# Patient Record
Sex: MALE | Race: WHITE | Employment: STUDENT | URBAN - METROPOLITAN AREA
[De-identification: names, ages, dates, MRNs, and addresses within clinical notes are randomized per-mention and may not be internally consistent; named-entity substitution may affect disease eponyms.]

---

## 2017-03-24 ENCOUNTER — APPOINTMENT (OUTPATIENT)
Dept: GENERAL RADIOLOGY | Age: 22
End: 2017-03-24
Attending: NURSE PRACTITIONER
Payer: COMMERCIAL

## 2017-03-24 ENCOUNTER — HOSPITAL ENCOUNTER (OUTPATIENT)
Age: 22
Discharge: HOME | End: 2017-03-24
Payer: COMMERCIAL

## 2017-03-24 VITALS
TEMPERATURE: 102 F | OXYGEN SATURATION: 98 % | BODY MASS INDEX: 23 KG/M2 | HEART RATE: 105 BPM | SYSTOLIC BLOOD PRESSURE: 141 MMHG | DIASTOLIC BLOOD PRESSURE: 83 MMHG | RESPIRATION RATE: 16 BRPM | WEIGHT: 136 LBS

## 2017-03-24 DIAGNOSIS — J11.1 INFLUENZA: Primary | ICD-10-CM

## 2017-03-24 LAB — POCT RAPID STREP: NEGATIVE

## 2017-03-24 PROCEDURE — 87081 CULTURE SCREEN ONLY: CPT | Performed by: NURSE PRACTITIONER

## 2017-03-24 PROCEDURE — 71020 XR CHEST PA + LAT CHEST (CPT=71020): CPT

## 2017-03-24 PROCEDURE — 99213 OFFICE O/P EST LOW 20 MIN: CPT

## 2017-03-24 PROCEDURE — 87147 CULTURE TYPE IMMUNOLOGIC: CPT | Performed by: NURSE PRACTITIONER

## 2017-03-24 PROCEDURE — 87430 STREP A AG IA: CPT | Performed by: NURSE PRACTITIONER

## 2017-03-24 PROCEDURE — 99214 OFFICE O/P EST MOD 30 MIN: CPT

## 2017-03-24 RX ORDER — ACETAMINOPHEN 500 MG
1000 TABLET ORAL ONCE
Status: COMPLETED | OUTPATIENT
Start: 2017-03-24 | End: 2017-03-24

## 2017-03-25 NOTE — ED PROVIDER NOTES
Patient Seen in: [address] [facility] [place]    History   Patient presents with:  Nasal Congestion  Body ache and/or chills  Sore Throat    Stated Complaint: cough/congestion    The history is provided by the patient.        22-year-old male who prese HPI.    Physical Exam       ED Triage Vitals   BP 03/24/17 1618 141/83 mmHg   Pulse 03/24/17 1618 105   Resp 03/24/17 1618 16   Temp 03/24/17 1618 102.3 °F (39.1 °C)   Temp src 03/24/17 1618 Oral   SpO2 03/24/17 1618 98 %   O2 Device 03/24/17 1618 None (Ro 3/24/2017 at 16:37     Approved by: Angelina Castro MD            MDM    I discussed the diagnosis and need for followup with their primary care physician for further evaluation and care.  Patient states they understand diagnosis, followup plan and agree wi

## 2017-09-17 ENCOUNTER — APPOINTMENT (OUTPATIENT)
Dept: GENERAL RADIOLOGY | Age: 22
End: 2017-09-17
Attending: FAMILY MEDICINE
Payer: COMMERCIAL

## 2017-09-17 ENCOUNTER — HOSPITAL ENCOUNTER (OUTPATIENT)
Age: 22
Discharge: ANOTHER HOSPITAL | End: 2017-09-17
Attending: FAMILY MEDICINE
Payer: COMMERCIAL

## 2017-09-17 ENCOUNTER — APPOINTMENT (OUTPATIENT)
Dept: CT IMAGING | Age: 22
End: 2017-09-17
Attending: FAMILY MEDICINE
Payer: COMMERCIAL

## 2017-09-17 VITALS
TEMPERATURE: 98 F | HEIGHT: 64 IN | SYSTOLIC BLOOD PRESSURE: 127 MMHG | WEIGHT: 135 LBS | DIASTOLIC BLOOD PRESSURE: 81 MMHG | RESPIRATION RATE: 20 BRPM | HEART RATE: 68 BPM | OXYGEN SATURATION: 99 % | BODY MASS INDEX: 23.05 KG/M2

## 2017-09-17 DIAGNOSIS — K35.80 ACUTE APPENDICITIS, UNSPECIFIED ACUTE APPENDICITIS TYPE: Primary | ICD-10-CM

## 2017-09-17 DIAGNOSIS — R10.9 ABDOMINAL PAIN, ACUTE: ICD-10-CM

## 2017-09-17 LAB
#LYMPHOCYTE IC: 1.6 X10ˆ3/UL (ref 0.9–3.2)
#MXD IC: 1.4 X10ˆ3/UL (ref 0.1–1)
#NEUTROPHIL IC: 14.6 X10ˆ3/UL (ref 1.3–6.7)
CREAT SERPL-MCNC: 1 MG/DL (ref 0.7–1.2)
GLUCOSE BLD-MCNC: 97 MG/DL (ref 65–99)
HCT IC: 41.6 % (ref 37–54)
HGB IC: 14.6 G/DL (ref 13–17)
ISTAT BLOOD GAS TCO2: 25 MMOL/L (ref 22–32)
ISTAT BUN: 12 MG/DL (ref 8–20)
ISTAT CHLORIDE: 101 MMOL/L (ref 101–111)
ISTAT HEMATOCRIT: 44 % (ref 37–54)
ISTAT IONIZED CALCIUM: 1.21 MMOL/L (ref 1.12–1.32)
ISTAT POTASSIUM: 3.8 MMOL/L (ref 3.6–5.1)
ISTAT SODIUM: 139 MMOL/L (ref 136–144)
LYMPHOCYTES NFR BLD AUTO: 9.2 %
MCH IC: 31.6 PG (ref 27–33.2)
MCHC IC: 35.1 G/DL (ref 31–37)
MCV IC: 90 FL (ref 80–99)
MIXED CELL %: 8.1 %
NEUTROPHILS NFR BLD AUTO: 82.7 %
PLT IC: 278 X10ˆ3/UL (ref 150–450)
POCT BILIRUBIN URINE: NEGATIVE
POCT BLOOD URINE: NEGATIVE
POCT GLUCOSE URINE: NEGATIVE MG/DL
POCT KETONE URINE: 80 MG/DL
POCT LEUKOCYTE ESTERASE URINE: NEGATIVE
POCT NITRITE URINE: NEGATIVE
POCT PH URINE: 7 (ref 5–8)
POCT SPECIFIC GRAVITY URINE: 1.02
POCT URINE CLARITY: CLEAR
POCT URINE COLOR: YELLOW
POCT UROBILINOGEN URINE: 1 MG/DL
RBC IC: 4.62 X10ˆ6/UL (ref 4.3–5.7)
WBC IC: 17.6 X10ˆ3/UL (ref 4–13)

## 2017-09-17 PROCEDURE — 74000 XR ABDOMEN (KUB) (1 AP VIEW)  (CPT=74000): CPT | Performed by: FAMILY MEDICINE

## 2017-09-17 PROCEDURE — 99214 OFFICE O/P EST MOD 30 MIN: CPT

## 2017-09-17 PROCEDURE — 80047 BASIC METABLC PNL IONIZED CA: CPT

## 2017-09-17 PROCEDURE — 85025 COMPLETE CBC W/AUTO DIFF WBC: CPT | Performed by: FAMILY MEDICINE

## 2017-09-17 PROCEDURE — 74176 CT ABD & PELVIS W/O CONTRAST: CPT | Performed by: FAMILY MEDICINE

## 2017-09-17 PROCEDURE — 81002 URINALYSIS NONAUTO W/O SCOPE: CPT | Performed by: FAMILY MEDICINE

## 2017-09-17 PROCEDURE — 99215 OFFICE O/P EST HI 40 MIN: CPT

## 2017-09-17 RX ORDER — ONDANSETRON 4 MG/1
4 TABLET, ORALLY DISINTEGRATING ORAL ONCE
Status: COMPLETED | OUTPATIENT
Start: 2017-09-17 | End: 2017-09-17

## 2017-09-17 NOTE — ED PROVIDER NOTES
Patient Seen in: [address] [facility] [place]    History   Patient presents with:  Abdominal Pain  Nausea/vomiting    Stated Complaint: ABD PAIN-NAUSEA- MAYBE SOMETHING HE ATE YESTERDAY    HPI  53-year-old male coming in with complaints of abdominal p SKIN: No pallor, no erythema, no cyanosis, warm and dry  Eyes: wnl, normal conjunctiva   HEAD: Normocephalic, atraumatic  EENT: OP - wnl, moist, Nares normal  NECK: FROM, supple  LUNGS: CTAB, no RRW  CV: RRR  ABD: Normoactive bowel sounds, tenderness not Specific Question: What is the Relevant Clinical Indication / Reason for Exam?          Answer: ABD PAIN-NAUSEA- MAYBE SOMETHING HE ATE YESTERDAY      POCT urinalysis dipstick Once      POCT CBC Once      POCT ISTAT chem8 cartridge Once      iStat (Chem 8) thickened measuring 9 mm. No inflammatory changes are noted. Acute appendicitis is considered unlikely, however not entirely excluded. 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     Dictated by: Jairo Plaza MD on 9/17/2017 at 12:32     Vladimir Savage patient.

## 2017-09-17 NOTE — ED INITIAL ASSESSMENT (HPI)
Patient states he ate a spicy chicken sandwich from [email or web address] yesterday at 5691. Approximately 2 hours after eating it he began having chills, nausea and cramping abd pain over entire abd. Emesis x 3. Denies diarrhea. Last BM was today.

## 2017-09-17 NOTE — ED NOTES
Patient is sitting on exam table rocking back and forth. States pain is now a 10/10 on pain scale. Nausea is resolving. Discussed with Dr Dashawn Bentley.

## 2018-04-17 ENCOUNTER — HOSPITAL ENCOUNTER (OUTPATIENT)
Age: 23
Discharge: HOME | End: 2018-04-17
Attending: FAMILY MEDICINE
Payer: COMMERCIAL

## 2018-04-17 VITALS
SYSTOLIC BLOOD PRESSURE: 102 MMHG | OXYGEN SATURATION: 100 % | HEART RATE: 50 BPM | TEMPERATURE: 98 F | DIASTOLIC BLOOD PRESSURE: 60 MMHG | RESPIRATION RATE: 16 BRPM

## 2018-04-17 DIAGNOSIS — K52.9 GASTROENTERITIS: Primary | ICD-10-CM

## 2018-04-17 PROCEDURE — 99214 OFFICE O/P EST MOD 30 MIN: CPT

## 2018-04-17 PROCEDURE — 80047 BASIC METABLC PNL IONIZED CA: CPT

## 2018-04-17 PROCEDURE — 85025 COMPLETE CBC W/AUTO DIFF WBC: CPT | Performed by: FAMILY MEDICINE

## 2018-04-17 PROCEDURE — 96361 HYDRATE IV INFUSION ADD-ON: CPT

## 2018-04-17 PROCEDURE — 96374 THER/PROPH/DIAG INJ IV PUSH: CPT

## 2018-04-17 PROCEDURE — 81002 URINALYSIS NONAUTO W/O SCOPE: CPT | Performed by: FAMILY MEDICINE

## 2018-04-17 RX ORDER — ONDANSETRON 2 MG/ML
4 INJECTION INTRAMUSCULAR; INTRAVENOUS EVERY 6 HOURS PRN
Status: DISCONTINUED | OUTPATIENT
Start: 2018-04-17 | End: 2018-04-17

## 2018-04-17 RX ORDER — SODIUM CHLORIDE 9 MG/ML
1000 INJECTION, SOLUTION INTRAVENOUS ONCE
Status: COMPLETED | OUTPATIENT
Start: 2018-04-17 | End: 2018-04-17

## 2018-04-17 RX ORDER — ONDANSETRON 4 MG/1
4 TABLET, ORALLY DISINTEGRATING ORAL EVERY 4 HOURS PRN
Qty: 10 TABLET | Refills: 0 | Status: SHIPPED | OUTPATIENT
Start: 2018-04-17 | End: 2018-04-24

## 2018-04-17 RX ORDER — ONDANSETRON 4 MG/1
4 TABLET, ORALLY DISINTEGRATING ORAL ONCE
Status: DISCONTINUED | OUTPATIENT
Start: 2018-04-17 | End: 2018-04-17

## 2018-04-17 RX ORDER — ONDANSETRON 2 MG/ML
4 INJECTION INTRAMUSCULAR; INTRAVENOUS ONCE
Status: COMPLETED | OUTPATIENT
Start: 2018-04-17 | End: 2018-04-17

## 2018-04-17 NOTE — ED PROVIDER NOTES
Patient Seen in: [address] [facility]    History   Patient presents with:  Abdomen/Flank Pain (GI/)  Nausea/Vomiting/Diarrhea (gastrointestinal)    Stated Complaint: ab pain/possible food related    HPI    26-year-old male presents for nause Effort normal and breath sounds normal.   Abdominal: Soft. He exhibits no distension. There is no tenderness. There is no rebound and no guarding. Neurological: He is alert and oriented to person, place, and time. Skin: Skin is warm and dry.

## 2018-04-17 NOTE — ED INITIAL ASSESSMENT (HPI)
Pt states woke at 0900 this am with abd pain in umbilical area. No radiation of pain. NVD this am. Nausea comes and goes. Pain intermittent in intensity.

## (undated) NOTE — ED AVS SNAPSHOT
THE [facility] in [facility] [address] [address]    Phone:  [phone]    Fax:  [phone]   MRN: [id number]    Department:  THE [facility] in [facility]   Date of Visit:  3/24/2017 [facility] [place], [address]  [phone] [address]  [address] [address]  [facility], [address] [place] [address]  [phone] [address] [place] [address] [address]   [phone]       To Check ER Wait Times:  TEXT 'ERwait' to 26 reading, you will be contacted. Please make sure we have your correct phone number before you leave. After you leave, you should follow the attached instructions. I have read and understand the instructions given to me by my caregivers.         24-Hour XR CHEST PA + LAT CHEST (CPT=71020) (Final result) Result time:  03/24/17 16:37:49    Final result    Impression:    CONCLUSION:  No acute pulmonary findings.            Dictated by: Angelina Castro MD on 3/24/2017 at 16:37       Approved by: Angelina Castro

## (undated) NOTE — LETTER
[facility] [place]  [address] [address]  Dept: [phone]  Dept Fax: [phone]      March 24, 2017    Patient: Yenny Hall   Date of Visit: 3/24/2017       To Whom It May Concern:    Barron Henderson was seen and

## (undated) NOTE — LETTER
Date & Time: 4/17/2018, 12:20 PM  Patient: Violetta Ceja  Encounter Provider(s):    Aida Coombs MD       To Whom It May Concern:    Jay Hagan was seen and treated in our department on 4/17/2018.  He can return to work on 04/18/1